# Patient Record
Sex: MALE | Race: OTHER | URBAN - METROPOLITAN AREA
[De-identification: names, ages, dates, MRNs, and addresses within clinical notes are randomized per-mention and may not be internally consistent; named-entity substitution may affect disease eponyms.]

---
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
## 2024-04-04 ENCOUNTER — TELEPHONE (OUTPATIENT)
Dept: FAMILY MEDICINE CLINIC | Facility: CLINIC | Age: 24
End: 2024-04-04

## 2024-04-04 NOTE — TELEPHONE ENCOUNTER
Spoke to pt, he is not interested in returning to the practice and asked to be removed from our list.

## 2024-04-05 ENCOUNTER — PATIENT OUTREACH (OUTPATIENT)
Dept: CASE MANAGEMENT | Age: 24
End: 2024-04-05

## 2024-04-05 NOTE — PROCEDURES
The office order for PCP removal request is Approved and finalized on April 5, 2024.    Thanks,  [facility]

## (undated) NOTE — LETTER
Name:  Flores Fink School Year:  College, Sophomore Class: Student ID No.:   Address:  [id number] [facility] [address] [address] Phone:  [phone] (home)  :  25year old   Name Relationship [address] Work Phone Home Phone Mobile Phone      HISTORY FOR pacemaker, or implanted defibrillator? No   16. Has anyone in your family had unexplained fainting, seizures, or near drowning?  No   BONE AND JOINT QUESTIONS    17. Have you ever had an injury to a bone, muscle, ligament, or tendon that caused you to miss legs after being hit or falling? No   39. Have you ever been unable to move your arms / legs after being hit /fall? No   40. Have you ever become ill while exercising in the heat? No   41. Do you get frequent muscle cramps when exercising? No   42.  Do you o span > height, hyperlaxity, myopia, MVP, aortic insufficiency) Yes    Eyes/Ears/Nose/Throat:    · Pupils equal  · Hearing Yes    Lymph nodes Yes    Heart*  · Murmurs (auscultation standing, supine, +/- Valsalva)  · Location of point of maximal impulse (PMI Substance Testing Program Protocol.  We have reviewed the policy and understand that I/our student may be asked to submit to testing for the presence of performance-enhancing substances in my/his/her body either during [employer] state series events or during the